# Patient Record
Sex: MALE | Race: WHITE | NOT HISPANIC OR LATINO | Employment: FULL TIME | URBAN - METROPOLITAN AREA
[De-identification: names, ages, dates, MRNs, and addresses within clinical notes are randomized per-mention and may not be internally consistent; named-entity substitution may affect disease eponyms.]

---

## 2017-05-30 ENCOUNTER — AMBULATORY - RIVER FALLS (OUTPATIENT)
Dept: FAMILY MEDICINE | Facility: CLINIC | Age: 32
End: 2017-05-30

## 2022-08-13 ENCOUNTER — OFFICE VISIT (OUTPATIENT)
Dept: URGENT CARE | Facility: URGENT CARE | Age: 37
End: 2022-08-13

## 2022-08-13 VITALS
SYSTOLIC BLOOD PRESSURE: 120 MMHG | DIASTOLIC BLOOD PRESSURE: 70 MMHG | TEMPERATURE: 97.3 F | HEART RATE: 75 BPM | OXYGEN SATURATION: 97 % | WEIGHT: 221.5 LBS

## 2022-08-13 DIAGNOSIS — J01.90 ACUTE SINUSITIS WITH SYMPTOMS > 10 DAYS: Primary | ICD-10-CM

## 2022-08-13 PROCEDURE — 99203 OFFICE O/P NEW LOW 30 MIN: CPT | Performed by: FAMILY MEDICINE

## 2022-08-13 NOTE — PROGRESS NOTES
Clinical Decision Making:    At the end of the encounter, I discussed results, diagnosis, medications. Discussed red flags for immediate return to clinic/ER, as well as indications for follow up if no improvement. Patient understood and agreed to plan. Patient was stable for discharge.      ICD-10-CM    1. Acute sinusitis with symptoms > 10 days  J01.90 amoxicillin-clavulanate (AUGMENTIN) 875-125 MG tablet     Treating with Augmentin twice daily for 10 days  Tylenol and ibuprofen as needed  Recommended nasal spray daily  Follow-up if not improving as anticipated      There are no Patient Instructions on file for this visit.   No follow-ups on file.      chief complaint    HPI:  Joel Rai is a 37 year old male who presents today complaining of right eye pain and pressure for 7 to 10 days.  For about 3 weeks has had a cough with nasal congestion and postnasal drip.  The last 7 to 10 days his headache has gotten worse.  He has been icing it as needed.  He also has been developing worsening sinus pain and pressure especially above and below his right eye.  He developed myalgias yesterday.  No shortness of breath right now but he did have that earlier on in his illness.  No fevers.  No ear pain.  He has not tested himself for COVID    History obtained from the patient.    Problem List:  There are no relevant problems documented for this patient.      History reviewed. No pertinent past medical history.    Social History     Tobacco Use     Smoking status: Never Smoker     Smokeless tobacco: Current User     Types: Chew   Substance Use Topics     Alcohol use: Not on file       Review of systems  negative except listed in HPI    Vitals:    08/13/22 1312   BP: 120/70   BP Location: Right arm   Pulse: 75   Temp: 97.3  F (36.3  C)   TempSrc: Tympanic   SpO2: 97%   Weight: 100.5 kg (221 lb 8 oz)       Physical Exam  Vitals noted and within normal limits.  Patient is alert, oriented, and in no acute distress.  Eyes:  Conjunctive not injected.  Ears: Canals patent, TMs intact, no erythema and no bulging.  Mouth: Mucous membranes pink and moist.  Pharynx is not erythematous.  Neck supple with no cervical lymphadenopathy.  Heart has a regular rate and rhythm with no murmurs.  Lungs are clear to auscultation bilaterally with good air entry.  No wheezes, rales, rhonchi.

## 2023-01-20 ENCOUNTER — TELEPHONE (OUTPATIENT)
Dept: NURSING | Facility: CLINIC | Age: 38
End: 2023-01-20

## 2023-01-20 NOTE — TELEPHONE ENCOUNTER
Mom calling. Red transfer. Patient is not with the caller. Symptoms described by transferring staff member were a swollen neck and face with vomiting and diarrhea. Patient passes out and wakes up and passes out again. Unable to triage as patient is not there. Mom encouraged to have patient go to the emergency department. Mom was also encouraged to have patient call himself.    Annetta Amador RN on 1/20/2023 at 1:04 PM